# Patient Record
Sex: FEMALE | Race: WHITE | NOT HISPANIC OR LATINO | Employment: OTHER | URBAN - METROPOLITAN AREA
[De-identification: names, ages, dates, MRNs, and addresses within clinical notes are randomized per-mention and may not be internally consistent; named-entity substitution may affect disease eponyms.]

---

## 2017-06-07 ENCOUNTER — GENERIC CONVERSION - ENCOUNTER (OUTPATIENT)
Dept: OTHER | Facility: OTHER | Age: 52
End: 2017-06-07

## 2018-08-17 ENCOUNTER — OFFICE VISIT (OUTPATIENT)
Dept: SLEEP CENTER | Facility: CLINIC | Age: 53
End: 2018-08-17
Payer: MEDICARE

## 2018-08-17 VITALS
WEIGHT: 209.4 LBS | HEART RATE: 82 BPM | RESPIRATION RATE: 14 BRPM | HEIGHT: 65 IN | DIASTOLIC BLOOD PRESSURE: 75 MMHG | BODY MASS INDEX: 34.89 KG/M2 | SYSTOLIC BLOOD PRESSURE: 108 MMHG

## 2018-08-17 DIAGNOSIS — E66.9 OBESITY (BMI 30-39.9): ICD-10-CM

## 2018-08-17 DIAGNOSIS — Z99.89 OSA ON CPAP: Primary | ICD-10-CM

## 2018-08-17 DIAGNOSIS — G47.33 OSA ON CPAP: Primary | ICD-10-CM

## 2018-08-17 PROCEDURE — 99213 OFFICE O/P EST LOW 20 MIN: CPT | Performed by: PSYCHIATRY & NEUROLOGY

## 2018-08-17 NOTE — PROGRESS NOTES
Progress Note - [address] [place] 46 y o  female   :1965, MRN: [id number]  2018          Follow Up Evaluation / Problem:     Obstructive Sleep Apnea    HPI: Wilfrido Ybarra is a 46y o  year old female  Patient has obstructive sleep apnea with AHI of 9 9 events per hours of sleep & during REM sleep 32 4 diagnosed in 2017   Patient has been using CPAP machine  PAP Pressure: Nasal CPAP set to deliver 9 cm of water pressure       Mask Used:    Oronasal/Full Face         DME Provider: Elias Benson    She has difficulty in using CPAP due to back issues  She has dental surgery in 2018 and has difficulty in using CPAP for 2 weeks in   She has been trying to use CPAP machine more frequently and I encouraged her to do so  Patient reported to have benefit in symptom of snoring and mild benefit daytime sleepiness and fatigue while using CPAP machine  Patient has been trying to use CPAP therapy for > 4 hours per night on >70% of nights  EPWORTH SLEEPINESS SCORE : 10      The CPAP download data was reviewed which shows reduce adherence with CPAP   The finding discussed with the patient  Patient needs new CPAP supplies  Yes     Patient denies any issues with CPAP mask or pressure           Current Outpatient Prescriptions:     busPIRone (BUSPAR) 10 mg tablet, Take 10 mg by mouth 2 (two) times a day, Disp: , Rfl: 5    butalbital-acetaminophen-caffeine (FIORICET,ESGIC) -40 mg per tablet, Take 1 tablet by mouth every 4 (four) hours, Disp: , Rfl:     cyclobenzaprine (FLEXERIL) 10 mg tablet, TAKE 1 TABLET BY ORAL ROUTE 2 TIMES EVERY DAY AS NEEDED FOR MUSCLE SPASM, Disp: , Rfl: 0    FLUoxetine (PROzac) 40 MG capsule, TAKE 1 CAPSULE BY ORAL ROUTE EVERY DAY IN THE MORNING, Disp: , Rfl: 0    furosemide (LASIX) 40 mg tablet, TAKE 1/2 TABLET BY ORAL ROUTE EVERY DAY, Disp: , Rfl: 0    lisinopril (ZESTRIL) 10 mg tablet, Take 10 mg by mouth every evening, Disp: , Rfl: 1    metoprolol tartrate (LOPRESSOR) 25 mg tablet, Take 1 5 tablets by mouth daily  , Disp: , Rfl:     simvastatin (ZOCOR) 10 mg tablet, Take 10 mg by mouth every evening, Disp: , Rfl: 5    topiramate (TOPAMAX) 25 mg tablet, TAKE 2 TABLETS BY MOUTH IN THE MORNING AND TAKE 2 TABLETS AT BEDTIME, Disp: , Rfl: 5    traMADol (ULTRAM) 50 mg tablet, Take 50 mg by mouth every 6 (six) hours, Disp: , Rfl:     FLUoxetine (PROzac) 40 MG capsule, Take 40 mg by mouth, Disp: , Rfl:     hydrOXYzine HCL (ATARAX) 25 mg tablet, Take 25 mg by mouth every 6 (six) hours, Disp: , Rfl:     Vitals:    08/17/18 1006   BP: 108/75   BP Location: Right arm   Patient Position: Sitting   Cuff Size: Standard   Pulse: 82   Resp: 14   Weight: 95 kg (209 lb 6 4 oz)   Height: 5' 5" (1 651 m)     PMHx:  Depression, Anxiety, Low back pain, CHF, s/p cardiac pacemake and defibrillator placement  History reviewed  No pertinent past medical history  History   Smoking Status    Heavy Tobacco Smoker    Packs/day: 1 00    Types: Cigarettes   Smokeless Tobacco    Never Used       Allergies   Allergen Reactions    Cefaclor Myalgia    Ciprofloxacin Itching    Penicillin V Rash    Sulfa Antibiotics Rash       Review of Systems      Genitourinary none   Cardiology none   Gastrointestinal none   Neurology frequent headaches and awaken with headache   Constitutional none   Integumentary none   Psychiatry anxiety and depression   Musculoskeletal back pain   Pulmonary snoring   ENT none   Endocrine excessive thirst   Hematological none       OBJECTIVE:      Physical Exam:     General Appearance:   Alert, cooperative, no distress, appears stated age  Head:   Normocephalic, without obvious abnormality, atraumatic     Nose:  Nares normal, septum   Mild deviation            Throat:  Teeth and gums: Unremarkable      Tongue normal size and  midline   Uvula : Unremarkable Tonsils: Unremarkable  Mallampati class:   3 Heart:    Lungs:   Regular rate and rhythm, S1 and S2 normal       Lungs are clear to auscultation        Extremities:  No ankle edema     Neurologic:  Patient found to be awake, alert and has adequate orientation  Speech adequate  Patient can move eyes across midline  No facial weakness  Patient can move extremities against gravity  ASSESSMENT / PLAN    Encounter Diagnoses   Name Primary?  LUIS MIGUEL on CPAP Yes    Obesity (BMI 30-39  9)      She has obstructive sleep apnea with initial difficulty in using CPAP machine but now she is trying her best to use CPAP machine on a regular basis and at least 4 hours every night for on most of the days  I encouraged her to do so  The patient was advised to do regular physical activity and diet control to attain ideal body weight  I plan to see patient back in sleep clinic in  3 months or earlier, if needed  The following instructions have been given to the patient today:    Patient Instructions   USE CPAP MACHINE ON REGULAR BASIS           MD Patria Gleason 15

## 2018-09-28 ENCOUNTER — TELEPHONE (OUTPATIENT)
Dept: NEUROLOGY | Facility: CLINIC | Age: 53
End: 2018-09-28

## 2018-09-28 NOTE — TELEPHONE ENCOUNTER
Is on topirimate 25 mg takes two tabs bid  Has seen dr Bhandari Coleman and put on cpap machine but is still having headaches  Can medication be increased?

## 2018-10-01 NOTE — TELEPHONE ENCOUNTER
Unable to do anything at this point in time  Patient's last appointment here was a bit over a year ago and she missed her follow-up and presently has no schedule appointment  In order for me to provide any further input she will need an appropriate follow-up here

## 2018-10-24 ENCOUNTER — OFFICE VISIT (OUTPATIENT)
Dept: NEUROLOGY | Facility: CLINIC | Age: 53
End: 2018-10-24
Payer: MEDICARE

## 2018-10-24 VITALS
SYSTOLIC BLOOD PRESSURE: 116 MMHG | BODY MASS INDEX: 34.79 KG/M2 | HEART RATE: 70 BPM | HEIGHT: 65 IN | WEIGHT: 208.8 LBS | DIASTOLIC BLOOD PRESSURE: 78 MMHG | RESPIRATION RATE: 18 BRPM

## 2018-10-24 DIAGNOSIS — R51.9 CHRONIC DAILY HEADACHE: Primary | ICD-10-CM

## 2018-10-24 PROCEDURE — 99213 OFFICE O/P EST LOW 20 MIN: CPT | Performed by: PSYCHIATRY & NEUROLOGY

## 2018-10-24 RX ORDER — TOPIRAMATE 25 MG/1
TABLET ORAL
Qty: 90 TABLET | Refills: 2 | Status: SHIPPED | OUTPATIENT
Start: 2018-10-24 | End: 2018-11-20 | Stop reason: DRUGHIGH

## 2018-10-24 NOTE — LETTER
October 24, 2018     Mabelyue RobertsShanna, [address] [facility] [address]    Patient: Marli Casey   YOB: 1965   Date of Visit: 10/24/2018       Dear Dr Wily Leonard: Thank you for referring Brianna Julien to me for evaluation  Below are my notes for this consultation  If you have questions, please do not hesitate to call me  I look forward to following your patient along with you  Sincerely,        Nikunj Brush MD        CC: No Recipients  Nikunj Brush MD  10/24/2018 12:42 PM  Sign at close encounter  Patient is here today for her headaches    Patient ID: Marli Casey is a 46 y o  female  Assessment/Plan:    Chronic daily headache  Headache with characteristics of both muscle contraction or tension type as well as vascular components (describing superimposed migrainous type headaches 2-3 times weekly at the present time)  Initially did very well on low-dose topiramate but as time has gone on has had the breakthrough with the headache circumstance as outlined above  She is still on a low-dose of topiramate and hopefully with further advancement in dose she will again achieve better control  Unfortunately, the treatment of her diagnosed mild obstructive sleep apnea has not created the environment for overall improvement in her headaches  --advance topiramate to 75 mg twice daily  --to maintain a headache record  --to call the office in 2-3 weeks with a status report or before then should she have any questions or difficulties tolerating the increase in topiramate  She will follow up in 2 months    Subjective:    HPI  Patient, 46years of age, has been followed here for daily headache up  Her headaches circumstance has been characterized by both tension-type as well as vascular (migrainous) components  She was initially placed on topiramate and was doing very well well at least initially on a dose of 50 mg twice daily    Unfortunately, with the passage of time, and she did miss her last follow-up appointment, her headaches have re-escalated to the point where she is back to having daily headache, primarily tension-type, but on 2-3 occasions weekly will have a true migrainous component appear  She was diagnosed with mild obstructive sleep apnea and all were hopeful that her headaches would improve with CPAP treatment  She has been faithfully using her CPAP but has had no positive impact on her headaches circumstance  She did have blood work performed and the results were reviewed  CBC with hemoglobin 14 0, hematocrit 40 2, white count 11 0 and platelet count 868  CMP was essentially unremarkable with a CO2 of 22 and a chloride of 105      Past Medical History:   Diagnosis Date    Anxiety and depression     [facility]     Heart disease     Lower back injury     Migraine without aura     Tension-type headache     V tach ([facility])     with placement of a dual pacemaker and defibrillator     Past Surgical History:   Procedure Laterality Date    CARDIAC DEFIBRILLATOR PLACEMENT      CARDIAC PACEMAKER PLACEMENT       Social History     Social History    Marital status: /Civil Union     Spouse name: N/A    Number of children: N/A    Years of education: N/A     Social History Main Topics    Smoking status: Heavy Tobacco Smoker     Packs/day: 1 00     Types: Cigarettes    Smokeless tobacco: Never Used    Alcohol use Yes      Comment: social     Drug use: No    Sexual activity: Not Asked     Other Topics Concern    None     Social History Narrative    None     Family History   Problem Relation Age of Onset    Heart disease Family     Stroke Family      Allergies   Allergen Reactions    Cefaclor Myalgia    Ciprofloxacin Itching    Penicillin V Rash    Sulfa Antibiotics Rash       Current Outpatient Prescriptions:     busPIRone (BUSPAR) 10 mg tablet, Take 10 mg by mouth 2 (two) times a day, Disp: , Rfl: 5   butalbital-acetaminophen-caffeine (FIORICET,ESGIC) -40 mg per tablet, Take 1 tablet by mouth every 4 (four) hours, Disp: , Rfl:     cyclobenzaprine (FLEXERIL) 10 mg tablet, TAKE 1 TABLET BY ORAL ROUTE 2 TIMES EVERY DAY AS NEEDED FOR MUSCLE SPASM, Disp: , Rfl: 0    FLUoxetine (PROzac) 40 MG capsule, TAKE 1 CAPSULE BY ORAL ROUTE EVERY DAY IN THE MORNING, Disp: , Rfl: 0    furosemide (LASIX) 40 mg tablet, TAKE 1/2 TABLET BY ORAL ROUTE EVERY DAY, Disp: , Rfl: 0    hydrOXYzine HCL (ATARAX) 25 mg tablet, Take 25 mg by mouth every 6 (six) hours, Disp: , Rfl:     lisinopril (ZESTRIL) 10 mg tablet, Take 10 mg by mouth every evening, Disp: , Rfl: 1    metoprolol tartrate (LOPRESSOR) 25 mg tablet, Take 1 5 tablets by mouth daily  , Disp: , Rfl:     simvastatin (ZOCOR) 10 mg tablet, Take 10 mg by mouth every evening, Disp: , Rfl: 5    topiramate (TOPAMAX) 25 mg tablet, Take 1 5 tablets in the morning and 1 5 tablets in the evening or as directed, Disp: 90 tablet, Rfl: 2    traMADol (ULTRAM) 50 mg tablet, Take 50 mg by mouth every 6 (six) hours, Disp: , Rfl:     Objective:    Blood pressure 116/78, pulse 70, resp  rate 18, height 5' 5" (1 651 m), weight 94 7 kg (208 lb 12 8 oz)  Physical Exam  Lungs clear to auscultation  Rhythm regular  Neurological Exam  Alert  Pleasantly interactive  Fully oriented  No dysarthria  Unremarkable spontaneous gait  Able to tandem walk  Romberg maneuver performed unremarkably  Cranial nerves 2-12 tested and grossly intact except for difficulties appreciating finger rub right ear which is old  Funduscopic examination with marginated discs bilaterally  Accurate finger-to-nose bilaterally  Motor testing revealed full symmetrical strength throughout the 4 extremities with no upper extremity drift  Sensory testing grossly intact to pin and position throughout    Muscle stretch reflexes bilaterally 1 throughout the upper extremities and bilaterally 1+ at the knees and ankles  Toe response is downgoing bilaterally  ROS:    Review of Systems   Constitutional: Negative  Negative for appetite change and fever  HENT: Negative  Negative for hearing loss, tinnitus, trouble swallowing and voice change  Eyes: Negative  Negative for photophobia and pain  Respiratory: Negative  Negative for shortness of breath  Cardiovascular: Negative  Negative for palpitations  Gastrointestinal: Negative  Negative for nausea and vomiting  Endocrine: Negative  Negative for cold intolerance and heat intolerance  Genitourinary: Positive for frequency and urgency  Negative for dysuria  Musculoskeletal: Positive for back pain (lower back), neck pain and neck stiffness  Negative for myalgias  Skin: Negative  Negative for rash  Allergic/Immunologic: Negative  Neurological: Positive for headaches  Negative for dizziness, tremors, seizures, syncope, facial asymmetry, speech difficulty, weakness, light-headedness and numbness  Hematological: Negative  Does not bruise/bleed easily  Psychiatric/Behavioral: Positive for sleep disturbance  Negative for confusion and hallucinations  I personally reviewed the ROS that was entered by the medical assistant

## 2018-10-24 NOTE — ASSESSMENT & PLAN NOTE
Headache with characteristics of both muscle contraction or tension type as well as vascular components (describing superimposed migrainous type headaches 2-3 times weekly at the present time)  Initially did very well on low-dose topiramate but as time has gone on has had the breakthrough with the headache circumstance as outlined above  She is still on a low-dose of topiramate and hopefully with further advancement in dose she will again achieve better control  Unfortunately, the treatment of her diagnosed mild obstructive sleep apnea has not created the environment for overall improvement in her headaches  --advance topiramate to 75 mg twice daily  --to maintain a headache record  --to call the office in 2-3 weeks with a status report or before then should she have any questions or difficulties tolerating the increase in topiramate

## 2018-10-24 NOTE — PATIENT INSTRUCTIONS
Using year current supply of topiramate 25 mg tablets begin taking 3 tablets in the morning and 3 tablets in the evening daily  When the prescription runs out and you refill it with topiramate 50 mg tablets take 1 5 tablets in the morning and 1 5 tablets in the evening  Please keep a record of ongoing headaches  Call in 2-3 weeks with a status update

## 2018-10-24 NOTE — PROGRESS NOTES
Patient is here today for her headaches    Patient ID: Polly Mendoza is a 46 y o  female  Assessment/Plan:    Chronic daily headache  Headache with characteristics of both muscle contraction or tension type as well as vascular components (describing superimposed migrainous type headaches 2-3 times weekly at the present time)  Initially did very well on low-dose topiramate but as time has gone on has had the breakthrough with the headache circumstance as outlined above  She is still on a low-dose of topiramate and hopefully with further advancement in dose she will again achieve better control  Unfortunately, the treatment of her diagnosed mild obstructive sleep apnea has not created the environment for overall improvement in her headaches  --advance topiramate to 75 mg twice daily  --to maintain a headache record  --to call the office in 2-3 weeks with a status report or before then should she have any questions or difficulties tolerating the increase in topiramate  She will follow up in 2 months    Subjective:    HPI  Patient, 46years of age, has been followed here for daily headache up  Her headaches circumstance has been characterized by both tension-type as well as vascular (migrainous) components  She was initially placed on topiramate and was doing very well well at least initially on a dose of 50 mg twice daily  Unfortunately, with the passage of time, and she did miss her last follow-up appointment, her headaches have re-escalated to the point where she is back to having daily headache, primarily tension-type, but on 2-3 occasions weekly will have a true migrainous component appear  She was diagnosed with mild obstructive sleep apnea and all were hopeful that her headaches would improve with CPAP treatment  She has been faithfully using her CPAP but has had no positive impact on her headaches circumstance  She did have blood work performed and the results were reviewed    CBC with hemoglobin 14 0, hematocrit 40 2, white count 11 0 and platelet count 248  CMP was essentially unremarkable with a CO2 of 22 and a chloride of 105      Past Medical History:   Diagnosis Date    Anxiety and depression     [facility]     Heart disease     Lower back injury     Migraine without aura     Tension-type headache     V tach ([facility])     with placement of a dual pacemaker and defibrillator     Past Surgical History:   Procedure Laterality Date    CARDIAC DEFIBRILLATOR PLACEMENT      CARDIAC PACEMAKER PLACEMENT       Social History     Social History    Marital status: /Civil Union     Spouse name: N/A    Number of children: N/A    Years of education: N/A     Social History Main Topics    Smoking status: Heavy Tobacco Smoker     Packs/day: 1 00     Types: Cigarettes    Smokeless tobacco: Never Used    Alcohol use Yes      Comment: social     Drug use: No    Sexual activity: Not Asked     Other Topics Concern    None     Social History Narrative    None     Family History   Problem Relation Age of Onset    Heart disease Family     Stroke Family      Allergies   Allergen Reactions    Cefaclor Myalgia    Ciprofloxacin Itching    Penicillin V Rash    Sulfa Antibiotics Rash       Current Outpatient Prescriptions:     busPIRone (BUSPAR) 10 mg tablet, Take 10 mg by mouth 2 (two) times a day, Disp: , Rfl: 5    butalbital-acetaminophen-caffeine (FIORICET,ESGIC) -40 mg per tablet, Take 1 tablet by mouth every 4 (four) hours, Disp: , Rfl:     cyclobenzaprine (FLEXERIL) 10 mg tablet, TAKE 1 TABLET BY ORAL ROUTE 2 TIMES EVERY DAY AS NEEDED FOR MUSCLE SPASM, Disp: , Rfl: 0    FLUoxetine (PROzac) 40 MG capsule, TAKE 1 CAPSULE BY ORAL ROUTE EVERY DAY IN THE MORNING, Disp: , Rfl: 0    furosemide (LASIX) 40 mg tablet, TAKE 1/2 TABLET BY ORAL ROUTE EVERY DAY, Disp: , Rfl: 0    hydrOXYzine HCL (ATARAX) 25 mg tablet, Take 25 mg by mouth every 6 (six) hours, Disp: , Rfl:     lisinopril (ZESTRIL) 10 mg tablet, Take 10 mg by mouth every evening, Disp: , Rfl: 1    metoprolol tartrate (LOPRESSOR) 25 mg tablet, Take 1 5 tablets by mouth daily  , Disp: , Rfl:     simvastatin (ZOCOR) 10 mg tablet, Take 10 mg by mouth every evening, Disp: , Rfl: 5    topiramate (TOPAMAX) 25 mg tablet, Take 1 5 tablets in the morning and 1 5 tablets in the evening or as directed, Disp: 90 tablet, Rfl: 2    traMADol (ULTRAM) 50 mg tablet, Take 50 mg by mouth every 6 (six) hours, Disp: , Rfl:     Objective:    Blood pressure 116/78, pulse 70, resp  rate 18, height 5' 5" (1 651 m), weight 94 7 kg (208 lb 12 8 oz)  Physical Exam  Lungs clear to auscultation  Rhythm regular  Neurological Exam  Alert  Pleasantly interactive  Fully oriented  No dysarthria  Unremarkable spontaneous gait  Able to tandem walk  Romberg maneuver performed unremarkably  Cranial nerves 2-12 tested and grossly intact except for difficulties appreciating finger rub right ear which is old  Funduscopic examination with marginated discs bilaterally  Accurate finger-to-nose bilaterally  Motor testing revealed full symmetrical strength throughout the 4 extremities with no upper extremity drift  Sensory testing grossly intact to pin and position throughout  Muscle stretch reflexes bilaterally 1 throughout the upper extremities and bilaterally 1+ at the knees and ankles  Toe response is downgoing bilaterally  ROS:    Review of Systems   Constitutional: Negative  Negative for appetite change and fever  HENT: Negative  Negative for hearing loss, tinnitus, trouble swallowing and voice change  Eyes: Negative  Negative for photophobia and pain  Respiratory: Negative  Negative for shortness of breath  Cardiovascular: Negative  Negative for palpitations  Gastrointestinal: Negative  Negative for nausea and vomiting  Endocrine: Negative  Negative for cold intolerance and heat intolerance  Genitourinary: Positive for frequency and urgency  Negative for dysuria  Musculoskeletal: Positive for back pain (lower back), neck pain and neck stiffness  Negative for myalgias  Skin: Negative  Negative for rash  Allergic/Immunologic: Negative  Neurological: Positive for headaches  Negative for dizziness, tremors, seizures, syncope, facial asymmetry, speech difficulty, weakness, light-headedness and numbness  Hematological: Negative  Does not bruise/bleed easily  Psychiatric/Behavioral: Positive for sleep disturbance  Negative for confusion and hallucinations  I personally reviewed the ROS that was entered by the medical assistant

## 2018-11-20 DIAGNOSIS — R51.9 CHRONIC DAILY HEADACHE: Primary | ICD-10-CM

## 2018-11-20 RX ORDER — TOPIRAMATE 50 MG/1
TABLET, FILM COATED ORAL
Qty: 90 TABLET | Refills: 2 | Status: SHIPPED | OUTPATIENT
Start: 2018-11-20 | End: 2019-02-23 | Stop reason: SDUPTHER

## 2019-01-04 ENCOUNTER — OFFICE VISIT (OUTPATIENT)
Dept: NEUROLOGY | Facility: CLINIC | Age: 54
End: 2019-01-04
Payer: MEDICARE

## 2019-01-04 VITALS
SYSTOLIC BLOOD PRESSURE: 122 MMHG | HEIGHT: 65 IN | HEART RATE: 90 BPM | DIASTOLIC BLOOD PRESSURE: 88 MMHG | WEIGHT: 212.4 LBS | RESPIRATION RATE: 18 BRPM | BODY MASS INDEX: 35.39 KG/M2

## 2019-01-04 DIAGNOSIS — G43.009 MIGRAINE WITHOUT AURA AND WITHOUT STATUS MIGRAINOSUS, NOT INTRACTABLE: Primary | ICD-10-CM

## 2019-01-04 DIAGNOSIS — R69 TAKING MULTIPLE MEDICATIONS FOR CHRONIC DISEASE: ICD-10-CM

## 2019-01-04 PROBLEM — R51.9 CHRONIC DAILY HEADACHE: Status: RESOLVED | Noted: 2018-10-24 | Resolved: 2019-01-04

## 2019-01-04 PROCEDURE — 99213 OFFICE O/P EST LOW 20 MIN: CPT | Performed by: PSYCHIATRY & NEUROLOGY

## 2019-01-04 NOTE — PROGRESS NOTES
Patient is here today for her migraine's    Patient ID: Sammy Castro is a 46 y o  female  Assessment/Plan:    Migraine without aura and without status migrainosus, not intractable  Has done extremely well with the advancement in her topiramate schedule  She has gone from a circumstance of headache every day of a tension-type in conjunction with three or more migraine type episodes weekly to now only three milder migraine headaches scattered over the past two months and only a very occasional tension-type headache  When she does have her migraines as of late, they are mild and respond generally to acetaminophen  --continue topiramate 50 mg tablets 1 5 tablets 2 times daily  --with her now on a chronically elevated topiramate schedule, check CBC, CMP and topiramate level  She will follow up in three months or p r n     Subjective:    HPI  Patient, 46years of age, returns to reassess the status of her headache circumstance  When last seen she was experiencing basically daily headache, mostly of tension-type, but on a weekly basis with three or perhaps more migraine type headache superimposed  Her topiramate schedule has been advanced to 75 mg twice daily  She has done very well  In fact, over the past two months or so she has had only three of her migraines, generally milder than in the past and responding to acetameophen  Her tension-type headaches are occurring only very occasionally now  She expresses no symptoms to suggest any adverse side effects from the advanced topiramate  She is very pleased with her overall improvement and at this point in time wishes no changes in her current schedule      Past Medical History:   Diagnosis Date    Anxiety and depression     [facility]     Heart disease     Lower back injury     Migraine without aura     Tension-type headache     V tach ([address] )     with placement of a dual pacemaker and defibrillator     Past Surgical History:   Procedure Laterality Date    CARDIAC DEFIBRILLATOR PLACEMENT      CARDIAC PACEMAKER PLACEMENT       Social History     Social History    Marital status: /Civil Union     Spouse name: N/A    Number of children: N/A    Years of education: N/A     Social History Main Topics    Smoking status: Heavy Tobacco Smoker     Packs/day: 1 00     Types: Cigarettes    Smokeless tobacco: Never Used    Alcohol use Yes      Comment: social     Drug use: No    Sexual activity: Not Asked     Other Topics Concern    None     Social History Narrative    None     Family History   Problem Relation Age of Onset    Heart disease Family     Stroke Family      Allergies   Allergen Reactions    Cefaclor Myalgia    Ciprofloxacin Itching    Penicillin V Rash    Sulfa Antibiotics Rash       Current Outpatient Prescriptions:     busPIRone (BUSPAR) 10 mg tablet, Take 10 mg by mouth 2 (two) times a day, Disp: , Rfl: 5    butalbital-acetaminophen-caffeine (FIORICET,ESGIC) -40 mg per tablet, Take 1 tablet by mouth every 4 (four) hours, Disp: , Rfl:     cyclobenzaprine (FLEXERIL) 10 mg tablet, TAKE 1 TABLET BY ORAL ROUTE 2 TIMES EVERY DAY AS NEEDED FOR MUSCLE SPASM, Disp: , Rfl: 0    FLUoxetine (PROzac) 40 MG capsule, TAKE 1 CAPSULE BY ORAL ROUTE EVERY DAY IN THE MORNING, Disp: , Rfl: 0    furosemide (LASIX) 40 mg tablet, TAKE 1/2 TABLET BY ORAL ROUTE EVERY DAY, Disp: , Rfl: 0    lisinopril (ZESTRIL) 10 mg tablet, Take 10 mg by mouth every evening, Disp: , Rfl: 1    metoprolol tartrate (LOPRESSOR) 25 mg tablet, Take 1 5 tablets by mouth daily  , Disp: , Rfl:     simvastatin (ZOCOR) 10 mg tablet, Take 10 mg by mouth every evening, Disp: , Rfl: 5    topiramate (TOPAMAX) 50 MG tablet, By oral route take 1 5 tablets twice daily or as directed, Disp: 90 tablet, Rfl: 2    traMADol (ULTRAM) 50 mg tablet, Take 50 mg by mouth every 6 (six) hours, Disp: , Rfl:     hydrOXYzine HCL (ATARAX) 25 mg tablet, Take 25 mg by mouth every 6 (six) hours, Disp: , Rfl:     Objective:    Blood pressure 122/88, pulse 90, resp  rate 18, height 5' 5" (1 651 m), weight 96 3 kg (212 lb 6 4 oz)  Physical Exam  Lungs clear to auscultation  Rhythm regular  Neurological Exam  Alert  Pleasantly interactive  Fully oriented and in good mood  Unremarkable spontaneous gait  Cranial nerves 2-12 tested and grossly intact except for issues with hearing right ear which is old  Funduscopic examination with marginated discs bilaterally  Good symmetrical strength throughout the four extremities with no upper extremity drift  Muscle stretch reflexes bilaterally 1 throughout the upper extremities and bilaterally 1+ at the knees and ankles  Toe response downgoing bilaterally  ROS:    Review of Systems   Constitutional: Negative  Negative for appetite change and fever  HENT: Negative  Negative for hearing loss, tinnitus, trouble swallowing and voice change  Eyes: Negative  Negative for photophobia and pain  Respiratory: Negative  Negative for shortness of breath  Cardiovascular: Negative  Negative for palpitations  Gastrointestinal: Negative  Negative for nausea and vomiting  Endocrine: Negative  Negative for cold intolerance and heat intolerance  Genitourinary: Positive for frequency and urgency  Negative for dysuria  Musculoskeletal: Negative  Negative for myalgias and neck pain  Skin: Negative  Negative for rash  Neurological: Negative  Negative for dizziness, tremors, seizures, syncope, facial asymmetry, speech difficulty, weakness, light-headedness, numbness and headaches  Hematological: Negative  Does not bruise/bleed easily  Psychiatric/Behavioral: Positive for sleep disturbance (at times)  Negative for confusion and hallucinations  I personally reviewed the ROS that was entered by the medical assistant      *Please note this document was created using voice recognition software and may contain sound-alike word errors  *

## 2019-01-04 NOTE — LETTER
January 4, 2019     [place], [address] [facility] [address]    Patient: Gregor Rizvi   YOB: 1965   Date of Visit: 1/4/2019       Dear Dr Srinivasa Rodney: Thank you for referring Yordan Blanchard to me for evaluation  Below are my notes for this consultation  If you have questions, please do not hesitate to call me  I look forward to following your patient along with you  Sincerely,        India Walker MD        CC: No Recipients  India Walker MD  1/4/2019 11:29 AM  Sign at close encounter  Patient is here today for her migraine's    Patient ID: Gregor Rizvi is a 46 y o  female  Assessment/Plan:    Migraine without aura and without status migrainosus, not intractable  Has done extremely well with the advancement in her topiramate schedule  She has gone from a circumstance of headache every day of a tension-type in conjunction with three or more migraine type episodes weekly to now only three milder migraine headaches scattered over the past two months and only a very occasional tension-type headache  When she does have her migraines as of late, they are mild and respond generally to acetaminophen  --continue topiramate 50 mg tablets 1 5 tablets 2 times daily  --with her now on a chronically elevated topiramate schedule, check CBC, CMP and topiramate level  She will follow up in three months or p r n     Subjective:    HPI  Patient, 46years of age, returns to reassess the status of her headache circumstance  When last seen she was experiencing basically daily headache, mostly of tension-type, but on a weekly basis with three or perhaps more migraine type headache superimposed  Her topiramate schedule has been advanced to 75 mg twice daily  She has done very well  In fact, over the past two months or so she has had only three of her migraines, generally milder than in the past and responding to acetameophen    Her tension-type headaches are occurring only very occasionally now  She expresses no symptoms to suggest any adverse side effects from the advanced topiramate  She is very pleased with her overall improvement and at this point in time wishes no changes in her current schedule      Past Medical History:   Diagnosis Date    Anxiety and depression     [facility]     Heart disease     Lower back injury     Migraine without aura     Tension-type headache     V tach (HCC)     with placement of a dual pacemaker and defibrillator     Past Surgical History:   Procedure Laterality Date    CARDIAC DEFIBRILLATOR PLACEMENT      CARDIAC PACEMAKER PLACEMENT       Social History     Social History    Marital status: /Civil Union     Spouse name: N/A    Number of children: N/A    Years of education: N/A     Social History Main Topics    Smoking status: Heavy Tobacco Smoker     Packs/day: 1 00     Types: Cigarettes    Smokeless tobacco: Never Used    Alcohol use Yes      Comment: social     Drug use: No    Sexual activity: Not Asked     Other Topics Concern    None     Social History Narrative    None     Family History   Problem Relation Age of Onset    Heart disease Family     Stroke Family      Allergies   Allergen Reactions    Cefaclor Myalgia    Ciprofloxacin Itching    Penicillin V Rash    Sulfa Antibiotics Rash       Current Outpatient Prescriptions:     busPIRone (BUSPAR) 10 mg tablet, Take 10 mg by mouth 2 (two) times a day, Disp: , Rfl: 5    butalbital-acetaminophen-caffeine (FIORICET,ESGIC) -40 mg per tablet, Take 1 tablet by mouth every 4 (four) hours, Disp: , Rfl:     cyclobenzaprine (FLEXERIL) 10 mg tablet, TAKE 1 TABLET BY ORAL ROUTE 2 TIMES EVERY DAY AS NEEDED FOR MUSCLE SPASM, Disp: , Rfl: 0    FLUoxetine (PROzac) 40 MG capsule, TAKE 1 CAPSULE BY ORAL ROUTE EVERY DAY IN THE MORNING, Disp: , Rfl: 0    furosemide (LASIX) 40 mg tablet, TAKE 1/2 TABLET BY ORAL ROUTE EVERY DAY, Disp: , Rfl: 0    lisinopril (ZESTRIL) 10 mg tablet, Take 10 mg by mouth every evening, Disp: , Rfl: 1    metoprolol tartrate (LOPRESSOR) 25 mg tablet, Take 1 5 tablets by mouth daily  , Disp: , Rfl:     simvastatin (ZOCOR) 10 mg tablet, Take 10 mg by mouth every evening, Disp: , Rfl: 5    topiramate (TOPAMAX) 50 MG tablet, By oral route take 1 5 tablets twice daily or as directed, Disp: 90 tablet, Rfl: 2    traMADol (ULTRAM) 50 mg tablet, Take 50 mg by mouth every 6 (six) hours, Disp: , Rfl:     hydrOXYzine HCL (ATARAX) 25 mg tablet, Take 25 mg by mouth every 6 (six) hours, Disp: , Rfl:     Objective:    Blood pressure 122/88, pulse 90, resp  rate 18, height 5' 5" (1 651 m), weight 96 3 kg (212 lb 6 4 oz)  Physical Exam  Lungs clear to auscultation  Rhythm regular  Neurological Exam  Alert  Pleasantly interactive  Fully oriented and in good mood  Unremarkable spontaneous gait  Cranial nerves 2-12 tested and grossly intact except for issues with hearing right ear which is old  Funduscopic examination with marginated discs bilaterally  Good symmetrical strength throughout the four extremities with no upper extremity drift  Muscle stretch reflexes bilaterally 1 throughout the upper extremities and bilaterally 1+ at the knees and ankles  Toe response downgoing bilaterally  ROS:    Review of Systems   Constitutional: Negative  Negative for appetite change and fever  HENT: Negative  Negative for hearing loss, tinnitus, trouble swallowing and voice change  Eyes: Negative  Negative for photophobia and pain  Respiratory: Negative  Negative for shortness of breath  Cardiovascular: Negative  Negative for palpitations  Gastrointestinal: Negative  Negative for nausea and vomiting  Endocrine: Negative  Negative for cold intolerance and heat intolerance  Genitourinary: Positive for frequency and urgency  Negative for dysuria  Musculoskeletal: Negative  Negative for myalgias and neck pain  Skin: Negative  Negative for rash  Neurological: Negative  Negative for dizziness, tremors, seizures, syncope, facial asymmetry, speech difficulty, weakness, light-headedness, numbness and headaches  Hematological: Negative  Does not bruise/bleed easily  Psychiatric/Behavioral: Positive for sleep disturbance (at times)  Negative for confusion and hallucinations  I personally reviewed the ROS that was entered by the medical assistant  *Please note this document was created using voice recognition software and may contain sound-alike word errors  *

## 2019-01-04 NOTE — ASSESSMENT & PLAN NOTE
Has done extremely well with the advancement in her topiramate schedule  She has gone from a circumstance of headache every day of a tension-type in conjunction with three or more migraine type episodes weekly to now only three milder migraine headaches scattered over the past two months and only a very occasional tension-type headache  When she does have her migraines as of late, they are mild and respond generally to acetaminophen  --continue topiramate 50 mg tablets 1 5 tablets 2 times daily  --with her now on a chronically elevated topiramate schedule, check CBC, CMP and topiramate level

## 2019-02-23 DIAGNOSIS — R51.9 CHRONIC DAILY HEADACHE: ICD-10-CM

## 2019-02-25 RX ORDER — TOPIRAMATE 50 MG/1
TABLET, FILM COATED ORAL
Qty: 90 TABLET | Refills: 2 | Status: SHIPPED | OUTPATIENT
Start: 2019-02-25 | End: 2019-05-26 | Stop reason: SDUPTHER

## 2019-03-25 ENCOUNTER — TELEPHONE (OUTPATIENT)
Dept: NEUROLOGY | Facility: CLINIC | Age: 54
End: 2019-03-25

## 2019-03-25 NOTE — TELEPHONE ENCOUNTER
I called and left a message for the patient to complete her blood work for her appointment on 4/5/19

## 2019-03-29 NOTE — TELEPHONE ENCOUNTER
Pt called back to state that she will have labs done at [facility] labs  Faxed orders to [phone]

## 2019-05-24 ENCOUNTER — TELEPHONE (OUTPATIENT)
Dept: NEUROLOGY | Facility: CLINIC | Age: 54
End: 2019-05-24

## 2019-05-26 DIAGNOSIS — R51.9 CHRONIC DAILY HEADACHE: ICD-10-CM

## 2019-05-28 RX ORDER — TOPIRAMATE 50 MG/1
TABLET, FILM COATED ORAL
Qty: 90 TABLET | Refills: 2 | Status: SHIPPED | OUTPATIENT
Start: 2019-05-28 | End: 2019-09-27 | Stop reason: DRUGHIGH

## 2019-09-27 ENCOUNTER — TELEPHONE (OUTPATIENT)
Dept: NEUROLOGY | Facility: CLINIC | Age: 54
End: 2019-09-27

## 2019-09-27 DIAGNOSIS — G43.009 MIGRAINE WITHOUT AURA AND WITHOUT STATUS MIGRAINOSUS, NOT INTRACTABLE: Primary | ICD-10-CM

## 2019-09-27 RX ORDER — TOPIRAMATE 25 MG/1
TABLET ORAL
Qty: 90 TABLET | Refills: 2 | Status: SHIPPED | OUTPATIENT
Start: 2019-09-27 | End: 2019-12-23 | Stop reason: SDUPTHER

## 2019-09-27 NOTE — TELEPHONE ENCOUNTER
Please contact patient with the following restart on her topiramate  Begin with 25 mg tablets 1 twice daily for 1 week followed by 1 in the morning and 2 in the evening daily  To recheck with the office in 2 weeks  Did review her recent blood work

## 2019-09-27 NOTE — TELEPHONE ENCOUNTER
Patient notify to restart topiramate with 25 mg tablets 1 twice daily for 1 week followed by 1 in the morning and 2 in the evening daily  To recheck in with the office in 2 weeks

## 2019-09-27 NOTE — TELEPHONE ENCOUNTER
Patient stated she weaned off topamax on her own because she wasn't getting headaches as much, has now been off the medication for about one month and now reports her migraines have come back with a vengeance  She is requesting Dr Iain Greenberg refill topiramate  She is going away on vacation next week and is hoping to have something to take  She has a /fu on 12/23  Please send to [facility] on file if agreeable  [phone] okay to leave detailed message

## 2019-12-17 ENCOUNTER — TELEPHONE (OUTPATIENT)
Dept: NEUROLOGY | Facility: CLINIC | Age: 54
End: 2019-12-17

## 2019-12-23 ENCOUNTER — OFFICE VISIT (OUTPATIENT)
Dept: NEUROLOGY | Facility: CLINIC | Age: 54
End: 2019-12-23
Payer: MEDICARE

## 2019-12-23 VITALS
DIASTOLIC BLOOD PRESSURE: 76 MMHG | SYSTOLIC BLOOD PRESSURE: 122 MMHG | BODY MASS INDEX: 36.71 KG/M2 | HEIGHT: 66 IN | RESPIRATION RATE: 18 BRPM | HEART RATE: 94 BPM | WEIGHT: 228.4 LBS

## 2019-12-23 DIAGNOSIS — G43.009 MIGRAINE WITHOUT AURA AND WITHOUT STATUS MIGRAINOSUS, NOT INTRACTABLE: ICD-10-CM

## 2019-12-23 PROCEDURE — 99213 OFFICE O/P EST LOW 20 MIN: CPT | Performed by: PSYCHIATRY & NEUROLOGY

## 2019-12-23 RX ORDER — VENLAFAXINE 37.5 MG/1
37.5 TABLET ORAL 2 TIMES DAILY
COMMUNITY
End: 2019-12-23 | Stop reason: DRUGHIGH

## 2019-12-23 RX ORDER — VENLAFAXINE 75 MG/1
75 TABLET ORAL 2 TIMES DAILY
Refills: 3 | COMMUNITY
Start: 2019-10-01

## 2019-12-23 RX ORDER — TOPIRAMATE 25 MG/1
TABLET ORAL
Qty: 180 TABLET | Refills: 3 | Status: SHIPPED | OUTPATIENT
Start: 2019-12-23 | End: 2020-03-25

## 2019-12-23 NOTE — PROGRESS NOTES
Patient ID: Cecil Tejeda is a 53 y o  female  Assessment/Plan:    Migraine without aura and without status migrainosus, not intractable  Unfortunately, when she self tapered off her to me for made by mid summer, she experienced a significant rebound with regard to her migraine headaches  In fact, prior to calling to reestablish the topiramate, she was experiencing multiple, intense episodes on a weekly basis  Topiramate reinstituted and presently at a schedule of 25 mg in the morning and 50 mg in the evening daily and she now has experience on average of 2 less intense headaches on a weekly basis  Hopeful for yet better control  Her control in the past was on a higher schedule of topiramate  --advance topiramate using 25 mg tablets to 2 tablets twice daily for 1 week, then 2 tablets in the morning and 3 tablets in the evening for 1 week, followed by 3 tablets twice daily  --to maintain a headache diary  --to call the office with a status update in 4 weeks or before then should she have questions or concerns  She will follow up in 8-10 weeks  Subjective:    HPI  Patient, now 53years of age, is being seen with regard to increasing headache issues  As a background, patient does have migraine without aura  At at 1 point in the past she was having multiple weekly episodes  This prompted the initiation of topiramate and at a schedule of 75 mg in the morning and 100 mg in the evening she did exceedingly well  In fact, she decided since she was doing so well to taper off her topiramate and she did so early to mid summer  That resulted in the emergence once again of multiple weekly episodes of intense migraine  She notify the office this past September  Topiramate was re-initiated and she has most recently been on a schedule of 25 mg in the morning and 50 mg in the evening daily  She has been experiencing an average of perhaps 2 headaches weekly, fortunately of less intensity    She is tolerating her current schedule topiramate with no adverse side effects  She did have recent blood work performed  Results reviewed:  CBC with hemoglobin 13 4, crit 38 9, white count 9 6 and platelet count 859; CMP with creatinine 0 84, AST 16 and ALT 29       Past Medical History:   Diagnosis Date    Anxiety and depression     [facility]     Heart disease     Lower back injury     Migraine without aura     Tension-type headache     V tach (HCC)     with placement of a dual pacemaker and defibrillator     Past Surgical History:   Procedure Laterality Date    CARDIAC DEFIBRILLATOR PLACEMENT      CARDIAC PACEMAKER PLACEMENT       Social History     Socioeconomic History    Marital status: /Civil Union     Spouse name: None    Number of children: None    Years of education: None    Highest education level: None   Occupational History    None   Social Needs    Financial resource strain: None    Food insecurity:     Worry: None     Inability: None    Transportation needs:     Medical: None     Non-medical: None   Tobacco Use    Smoking status: Heavy Tobacco Smoker     Packs/day: 1 00     Types: Cigarettes    Smokeless tobacco: Never Used   Substance and Sexual Activity    Alcohol use: Yes     Comment: social     Drug use: No    Sexual activity: None   Lifestyle    Physical activity:     Days per week: None     Minutes per session: None    Stress: None   Relationships    Social connections:     Talks on phone: None     Gets together: None     Attends Hoah[employer]ism service: None     Active member of club or organization: None     Attends meetings of clubs or organizations: None     Relationship status: None    Intimate partner violence:     Fear of current or ex partner: None     Emotionally abused: None     Physically abused: None     Forced sexual activity: None   Other Topics Concern    None   Social History Narrative    None     Family History   Problem Relation Age of Onset  Heart disease Family     Stroke Family      Allergies   Allergen Reactions    Cefaclor Myalgia    Ciprofloxacin Itching    Penicillin V Rash    Sulfa Antibiotics Rash       Current Outpatient Medications:     busPIRone (BUSPAR) 10 mg tablet, Take 10 mg by mouth 2 (two) times a day, Disp: , Rfl: 5    butalbital-acetaminophen-caffeine (FIORICET,ESGIC) -40 mg per tablet, Take 1 tablet by mouth every 4 (four) hours, Disp: , Rfl:     cyclobenzaprine (FLEXERIL) 10 mg tablet, TAKE 1 TABLET BY ORAL ROUTE 2 TIMES EVERY DAY AS NEEDED FOR MUSCLE SPASM, Disp: , Rfl: 0    furosemide (LASIX) 40 mg tablet, TAKE 1/2 TABLET BY ORAL ROUTE EVERY DAY, Disp: , Rfl: 0    hydrOXYzine HCL (ATARAX) 25 mg tablet, Take 25 mg by mouth every 6 (six) hours, Disp: , Rfl:     lisinopril (ZESTRIL) 10 mg tablet, Take 10 mg by mouth every evening, Disp: , Rfl: 1    metoprolol tartrate (LOPRESSOR) 25 mg tablet, Take 1 5 tablets by mouth daily  , Disp: , Rfl:     topiramate (TOPAMAX) 25 mg tablet, By mouth take 3 tablets twice daily or as directed, Disp: 180 tablet, Rfl: 3    traMADol (ULTRAM) 50 mg tablet, Take 50 mg by mouth every 6 (six) hours, Disp: , Rfl:     venlafaxine (EFFEXOR) 75 mg tablet, Take 75 mg by mouth 2 (two) times a day, Disp: , Rfl: 3    FLUoxetine (PROzac) 40 MG capsule, TAKE 1 CAPSULE BY ORAL ROUTE EVERY DAY IN THE MORNING, Disp: , Rfl: 0    simvastatin (ZOCOR) 10 mg tablet, Take 10 mg by mouth every evening, Disp: , Rfl: 5    Objective:    Blood pressure 122/76, pulse 94, resp  rate 18, height 5' 5 5" (1 664 m), weight 104 kg (228 lb 6 4 oz)  Physical Exam  Head normocephalic  Eyes nonicteric  No audible anterior neck bruits  Lungs clear to auscultation  Rhythm regular  GI (abdomen) soft nontender  Bowel sounds present  No significant lower extremity edema  Neurological Exam  Alert  Fully oriented  Appeared in good mood  Pleasantly interactive  Spontaneous gait unremarkable    Cranial nerves 2-12 tested and grossly intact except for issues with hearing right ear, old  Funduscopic examination with bilaterally marginated discs  Accurate with finger-to-nose bilaterally  Good symmetrical strength throughout the 4 extremities  No upper extremity drift  Muscle stretch reflexes bilaterally 1 throughout the upper extremities and bilaterally 1+ at the knees and ankles  ROS:    Review of Systems   Constitutional: Negative  Negative for appetite change and fever  HENT: Negative  Negative for hearing loss, tinnitus, trouble swallowing and voice change  Eyes: Negative  Negative for photophobia and pain  Respiratory: Negative  Negative for shortness of breath  Cardiovascular: Negative  Negative for palpitations  Gastrointestinal: Negative  Negative for nausea and vomiting  Endocrine: Negative  Negative for cold intolerance and heat intolerance  Genitourinary: Negative  Negative for dysuria, frequency and urgency  Musculoskeletal: Negative  Negative for myalgias and neck pain  Skin: Negative  Negative for rash  Neurological: Positive for headaches  Negative for dizziness, tremors, seizures, syncope, facial asymmetry, speech difficulty, weakness, light-headedness and numbness  Hematological: Negative  Does not bruise/bleed easily  Psychiatric/Behavioral: Negative  Negative for confusion, hallucinations and sleep disturbance  I personally reviewed the ROS as entered by the medical assistant  *Please note this document was created using voice recognition software and may contain sound-alike word errors  *

## 2019-12-23 NOTE — PATIENT INSTRUCTIONS
Increase topiramate using 25 mg tablets to 2 tablets twice daily for 1 week, then 2 tablets in the morning and 3 tablets in the evening for 1 week, then 3 tablets twice daily  Please keep a headache diary  Call in 1 month with a status report and to discuss possible further changes in medication

## 2019-12-23 NOTE — ASSESSMENT & PLAN NOTE
Unfortunately, when she self tapered off her to me for made by mid summer, she experienced a significant rebound with regard to her migraine headaches  In fact, prior to calling to reestablish the topiramate, she was experiencing multiple, intense episodes on a weekly basis  Topiramate reinstituted and presently at a schedule of 25 mg in the morning and 50 mg in the evening daily and she now has experience on average of 2 less intense headaches on a weekly basis  Hopeful for yet better control  Her control in the past was on a higher schedule of topiramate  --advance topiramate using 25 mg tablets to 2 tablets twice daily for 1 week, then 2 tablets in the morning and 3 tablets in the evening for 1 week, followed by 3 tablets twice daily  --to maintain a headache diary  --to call the office with a status update in 4 weeks or before then should she have questions or concerns

## 2019-12-23 NOTE — LETTER
December 23, 2019     Alicia Fajardo, DO  [address] [facility] [address]    Patient: Casandra Vegas   YOB: 1965   Date of Visit: 12/23/2019       Dear Dr Sharmila Stovall: Thank you for referring Paige Gray to me for evaluation  Below are my notes for this consultation  If you have questions, please do not hesitate to call me  I look forward to following your patient along with you  Sincerely,        Lorrie Cervantes MD        CC: No Recipients  Lorrie Cervantes MD  12/23/2019  4:12 PM  Sign at close encounter  Patient ID: Casandra Vegas is a 53 y o  female  Assessment/Plan:    Migraine without aura and without status migrainosus, not intractable  Unfortunately, when she self tapered off her to me for made by mid summer, she experienced a significant rebound with regard to her migraine headaches  In fact, prior to calling to reestablish the topiramate, she was experiencing multiple, intense episodes on a weekly basis  Topiramate reinstituted and presently at a schedule of 25 mg in the morning and 50 mg in the evening daily and she now has experience on average of 2 less intense headaches on a weekly basis  Hopeful for yet better control  Her control in the past was on a higher schedule of topiramate  --advance topiramate using 25 mg tablets to 2 tablets twice daily for 1 week, then 2 tablets in the morning and 3 tablets in the evening for 1 week, followed by 3 tablets twice daily  --to maintain a headache diary  --to call the office with a status update in 4 weeks or before then should she have questions or concerns  She will follow up in 8-10 weeks  Subjective:    HPI  Patient, now 53years of age, is being seen with regard to increasing headache issues  As a background, patient does have migraine without aura  At at 1 point in the past she was having multiple weekly episodes    This prompted the initiation of topiramate and at a schedule of 75 mg in the morning and 100 mg in the evening she did exceedingly well  In fact, she decided since she was doing so well to taper off her topiramate and she did so early to mid summer  That resulted in the emergence once again of multiple weekly episodes of intense migraine  She notify the office this past September  Topiramate was re-initiated and she has most recently been on a schedule of 25 mg in the morning and 50 mg in the evening daily  She has been experiencing an average of perhaps 2 headaches weekly, fortunately of less intensity  She is tolerating her current schedule topiramate with no adverse side effects  She did have recent blood work performed  Results reviewed:  CBC with hemoglobin 13 4, crit 38 9, white count 9 6 and platelet count 762; CMP with creatinine 0 84, AST 16 and ALT 29       Past Medical History:   Diagnosis Date    Anxiety and depression     [facility]congest[facility] failure) ([facility])     Heart disease     Lower back injury     Migraine without aura     Tension-type headache     V tach ([facility])     with placement of a dual pacemaker and defibrillator     Past Surgical History:   Procedure Laterality Date    CARDIAC DEFIBRILLATOR PLACEMENT      CARDIAC PACEMAKER PLACEMENT       Social History     Socioeconomic History    Marital status: /Civil Union     Spouse name: None    Number of children: None    Years of education: None    Highest education level: None   Occupational History    None   Social Needs    Financial resource strain: None    Food insecurity:     Worry: None     Inability: None    Transportation needs:     Medical: None     Non-medical: None   Tobacco Use    Smoking status: Heavy Tobacco Smoker     Packs/day: 1 00     Types: Cigarettes    Smokeless tobacco: Never Used   Substance and Sexual Activity    Alcohol use: Yes     Comment: social     Drug use: No    Sexual activity: None   Lifestyle    Physical activity:     Days per week: None     Minutes per session: None    Stress: None   Relationships    Social connections:     Talks on phone: None     Gets together: None     Attends Nondenominational service: None     Active member of club or organization: None     Attends meetings of clubs or organizations: None     Relationship status: None    Intimate partner violence:     Fear of current or ex partner: None     Emotionally abused: None     Physically abused: None     Forced sexual activity: None   Other Topics Concern    None   Social History Narrative    None     Family History   Problem Relation Age of Onset    Heart disease Family     Stroke Family      Allergies   Allergen Reactions    Cefaclor Myalgia    Ciprofloxacin Itching    Penicillin V Rash    Sulfa Antibiotics Rash       Current Outpatient Medications:     busPIRone (BUSPAR) 10 mg tablet, Take 10 mg by mouth 2 (two) times a day, Disp: , Rfl: 5    butalbital-acetaminophen-caffeine (FIORICET,ESGIC) -40 mg per tablet, Take 1 tablet by mouth every 4 (four) hours, Disp: , Rfl:     cyclobenzaprine (FLEXERIL) 10 mg tablet, TAKE 1 TABLET BY ORAL ROUTE 2 TIMES EVERY DAY AS NEEDED FOR MUSCLE SPASM, Disp: , Rfl: 0    furosemide (LASIX) 40 mg tablet, TAKE 1/2 TABLET BY ORAL ROUTE EVERY DAY, Disp: , Rfl: 0    hydrOXYzine HCL (ATARAX) 25 mg tablet, Take 25 mg by mouth every 6 (six) hours, Disp: , Rfl:     lisinopril (ZESTRIL) 10 mg tablet, Take 10 mg by mouth every evening, Disp: , Rfl: 1    metoprolol tartrate (LOPRESSOR) 25 mg tablet, Take 1 5 tablets by mouth daily  , Disp: , Rfl:     topiramate (TOPAMAX) 25 mg tablet, By mouth take 3 tablets twice daily or as directed, Disp: 180 tablet, Rfl: 3    traMADol (ULTRAM) 50 mg tablet, Take 50 mg by mouth every 6 (six) hours, Disp: , Rfl:     venlafaxine (EFFEXOR) 75 mg tablet, Take 75 mg by mouth 2 (two) times a day, Disp: , Rfl: 3    FLUoxetine (PROzac) 40 MG capsule, TAKE 1 CAPSULE BY ORAL ROUTE EVERY DAY IN THE MORNING, Disp: , Rfl: 0    simvastatin (ZOCOR) 10 mg tablet, Take 10 mg by mouth every evening, Disp: , Rfl: 5    Objective:    Blood pressure 122/76, pulse 94, resp  rate 18, height 5' 5 5" (1 664 m), weight 104 kg (228 lb 6 4 oz)  Physical Exam  Head normocephalic  Eyes nonicteric  No audible anterior neck bruits  Lungs clear to auscultation  Rhythm regular  GI (abdomen) soft nontender  Bowel sounds present  No significant lower extremity edema  Neurological Exam  Alert  Fully oriented  Appeared in good mood  Pleasantly interactive  Spontaneous gait unremarkable  Cranial nerves 2-12 tested and grossly intact except for issues with hearing right ear, old  Funduscopic examination with bilaterally marginated discs  Accurate with finger-to-nose bilaterally  Good symmetrical strength throughout the 4 extremities  No upper extremity drift  Muscle stretch reflexes bilaterally 1 throughout the upper extremities and bilaterally 1+ at the knees and ankles  ROS:    Review of Systems   Constitutional: Negative  Negative for appetite change and fever  HENT: Negative  Negative for hearing loss, tinnitus, trouble swallowing and voice change  Eyes: Negative  Negative for photophobia and pain  Respiratory: Negative  Negative for shortness of breath  Cardiovascular: Negative  Negative for palpitations  Gastrointestinal: Negative  Negative for nausea and vomiting  Endocrine: Negative  Negative for cold intolerance and heat intolerance  Genitourinary: Negative  Negative for dysuria, frequency and urgency  Musculoskeletal: Negative  Negative for myalgias and neck pain  Skin: Negative  Negative for rash  Neurological: Positive for headaches  Negative for dizziness, tremors, seizures, syncope, facial asymmetry, speech difficulty, weakness, light-headedness and numbness  Hematological: Negative  Does not bruise/bleed easily  Psychiatric/Behavioral: Negative    Negative for confusion, hallucinations and sleep disturbance  I personally reviewed the ROS as entered by the medical assistant  *Please note this document was created using voice recognition software and may contain sound-alike word errors  *

## 2020-03-20 DIAGNOSIS — G43.009 MIGRAINE WITHOUT AURA AND WITHOUT STATUS MIGRAINOSUS, NOT INTRACTABLE: ICD-10-CM

## 2020-03-25 RX ORDER — TOPIRAMATE 25 MG/1
TABLET ORAL
Qty: 180 TABLET | Refills: 0 | Status: SHIPPED | OUTPATIENT
Start: 2020-03-25 | End: 2020-03-31 | Stop reason: DRUGHIGH

## 2020-03-26 ENCOUNTER — TELEPHONE (OUTPATIENT)
Dept: NEUROLOGY | Facility: CLINIC | Age: 55
End: 2020-03-26

## 2020-03-26 NOTE — TELEPHONE ENCOUNTER
Telephone call to patient informing her that Dr Raffaele Castro sent in a refill for her Topiramate and this will be the last script that he will refill until she is seen in the office

## 2020-03-31 ENCOUNTER — TELEPHONE (OUTPATIENT)
Dept: NEUROLOGY | Facility: CLINIC | Age: 55
End: 2020-03-31

## 2020-03-31 ENCOUNTER — TELEMEDICINE (OUTPATIENT)
Dept: NEUROLOGY | Facility: CLINIC | Age: 55
End: 2020-03-31
Payer: MEDICARE

## 2020-03-31 DIAGNOSIS — G43.009 MIGRAINE WITHOUT AURA AND WITHOUT STATUS MIGRAINOSUS, NOT INTRACTABLE: Primary | ICD-10-CM

## 2020-03-31 DIAGNOSIS — G47.00 INSOMNIA, UNSPECIFIED TYPE: ICD-10-CM

## 2020-03-31 DIAGNOSIS — G47.33 OBSTRUCTIVE SLEEP APNEA (ADULT) (PEDIATRIC): ICD-10-CM

## 2020-03-31 DIAGNOSIS — G44.229 CHRONIC TENSION-TYPE HEADACHE, NOT INTRACTABLE: ICD-10-CM

## 2020-03-31 PROCEDURE — 99442 PR PHYS/QHP TELEPHONE EVALUATION 11-20 MIN: CPT | Performed by: PHYSICIAN ASSISTANT

## 2020-03-31 RX ORDER — TOPIRAMATE 100 MG/1
100 TABLET, FILM COATED ORAL 2 TIMES DAILY
Qty: 60 TABLET | Refills: 3 | Status: SHIPPED | OUTPATIENT
Start: 2020-03-31 | End: 2020-07-27

## 2020-03-31 NOTE — TELEPHONE ENCOUNTER
Lmom for pt to call back and schedule 4m f/u with Vida Weeks or Dr Ether Sicard and get appt set up for Sleep Consult

## 2020-03-31 NOTE — PROGRESS NOTES
Virtual Brief Visit    Patient continues to have headaches 3 to 4 times a week  She is currently on Topamax 75 mg b i d  For headache prevention  She has a combination of both tension type headaches and migraines  We will increase Topamax to 100 mg b i d  I also advised her to add magnesium oxide 400-500 mg daily along with B2 400 mg daily  We discussed headache triggers in detail  I asked her to stay very hydrated, get plenty of sleep and do not skip any meals  Advised to limit the amount of OTC analgesics to no more than 2 to 3 times a week to prevent rebound headache  She reports poor sleep  She has difficulty maintaining sleep  She also has history of sleep apnea and does not use a CPAP at this time  I suggested a referral to sleep medicine and she is agreeable  Will see the patient back in the office in 3-4 months or sooner if needed  She was advised to call the office for any new or worsening symptoms  Problem List Items Addressed This Visit        Cardiovascular and Mediastinum    Migraine without aura and without status migrainosus, not intractable - Primary    Relevant Medications    topiramate (TOPAMAX) 100 mg tablet       Nervous and Auditory    Tension headache, chronic    Relevant Medications    topiramate (TOPAMAX) 100 mg tablet      Other Visit Diagnoses     Insomnia, unspecified type        Relevant Orders    Ambulatory referral to Sleep Medicine    Obstructive sleep apnea (adult) (pediatric)        Relevant Orders    Ambulatory referral to Sleep Medicine                Reason for visit is follow up for headaches      Encounter provider Fredy Walton PA-C    Provider located at [facility] [address]      Recent Visits  Date Type Provider Dept   03/26/20 Telephone Pradip Crook MA [facility]   Showing recent visits within past 7 days and meeting all other requirements     Today's Visits  Date Type Provider Dept   03/31/20 Telemedicine Tracy Hernandez PA-C [facility]   Showing today's visits and meeting all other requirements     Future Appointments  Date Type Provider Dept   03/31/20 Telemedicine Tracy Hernandez PA-C [facility]   Showing future appointments within next 150 days and meeting all other requirements        After connecting through telephone, the patient was identified by name and date of birth  Lilliam Ochoa was informed that this is a telemedicine visit and that the visit is being conducted through telephone  My office door was closed  No one else was in the room  She acknowledged consent and understanding of privacy and security of the video platform  The patient has agreed to participate and understands they can discontinue the visit at any time  We discussed the limitations of the telemedicine platform and that my medical advice and examination would be limited as a consequence, the patient expressed full understanding  The patient initiated communication and agreed to participate    Patient is aware this is a billable service  Subjective  Colonel Arizmendi is a 63 y o  female who is being seen today for follow up of her headaches  She was last seen in the office in December 2019 by Dr nAa Fine  Patient has been followed for tension-type headaches and migraines  She has been on topiramate as a preventative, which has generally worked well for her  In the summer of 2019, she self discontinued the medication and had an increase in headaches  Topiramate was restarted in Sept 2019  At timing of last visit, topiramate was increased to 75mg BID, which is her current dose  Today, patient reports she is still getting headaches about half of the week  She is tolerating the topiramate well, denies any side effects  She is hoping this can be increased  She is using tylenol when she gets a headache    She notes she has poor sleep, trouble maintaining sleep  She feels this contributes to her HAs  Patient with history of "mild LUIS MIGUEL" (per pt) and was previously on a CPAP, but not using currently  Past Medical History:   Diagnosis Date    Anxiety and depression     [facility])     Heart disease     Lower back injury     Migraine without aura     Tension-type headache     V tach ([address] )     with placement of a dual pacemaker and defibrillator       Past Surgical History:   Procedure Laterality Date    CARDIAC DEFIBRILLATOR PLACEMENT      CARDIAC PACEMAKER PLACEMENT         Current Outpatient Medications   Medication Sig Dispense Refill    busPIRone (BUSPAR) 10 mg tablet Take 10 mg by mouth 2 (two) times a day  5    cyclobenzaprine (FLEXERIL) 10 mg tablet TAKE 1 TABLET BY ORAL ROUTE 2 TIMES EVERY DAY AS NEEDED FOR MUSCLE SPASM  0    furosemide (LASIX) 40 mg tablet TAKE 1/2 TABLET BY ORAL ROUTE EVERY DAY  0    hydrOXYzine HCL (ATARAX) 25 mg tablet Take 25 mg by mouth every 6 (six) hours      lisinopril (ZESTRIL) 10 mg tablet Take 10 mg by mouth every evening  1    metoprolol tartrate (LOPRESSOR) 25 mg tablet Take 1 5 tablets by mouth daily        simvastatin (ZOCOR) 10 mg tablet Take 10 mg by mouth every evening  5    traMADol (ULTRAM) 50 mg tablet Take 50 mg by mouth every 6 (six) hours      venlafaxine (EFFEXOR) 75 mg tablet Take 75 mg by mouth 2 (two) times a day  3    butalbital-acetaminophen-caffeine (FIORICET,ESGIC) -40 mg per tablet Take 1 tablet by mouth every 4 (four) hours      FLUoxetine (PROzac) 40 MG capsule TAKE 1 CAPSULE BY ORAL ROUTE EVERY DAY IN THE MORNING  0    topiramate (TOPAMAX) 100 mg tablet Take 1 tablet (100 mg total) by mouth 2 (two) times a day 60 tablet 3     No current facility-administered medications for this visit           Allergies   Allergen Reactions    Cefaclor Myalgia    Ciprofloxacin Itching    Penicillin V Rash    Sulfa Antibiotics Rash       Review of Systems Constitutional: Negative  HENT: Negative  Eyes: Negative for photophobia, pain and visual disturbance  Respiratory: Negative  Cardiovascular: Negative  Gastrointestinal: Negative for constipation, diarrhea, nausea and vomiting  Endocrine: Negative  Genitourinary: Negative  Musculoskeletal: Negative  Skin: Negative  Neurological: Positive for headaches  Hematological: Negative  Psychiatric/Behavioral: Negative for confusion, decreased concentration, dysphoric mood, hallucinations, sleep disturbance and suicidal ideas  The patient is not nervous/anxious  I spent 12 minutes with the patient during this visit

## 2020-03-31 NOTE — PATIENT INSTRUCTIONS
We will increase topiramate to 100 mg twice a day  He can also add over-the-counter magnesium oxide 400-500 mg daily and B2 400 mg daily for headache prevention  Please trying limit the amount of over-the-counter medications such as Tylenol or ibuprofen to no more than 2 to 3 times a week to prevent rebound headaches  Will refer you to Sleep Medicine for an evaluation  Will see you back in the office in 3-4 months or sooner if needed  Please do not hesitate to call the office for any questions or concerns

## 2020-04-16 DIAGNOSIS — G43.009 MIGRAINE WITHOUT AURA AND WITHOUT STATUS MIGRAINOSUS, NOT INTRACTABLE: ICD-10-CM

## 2020-04-16 RX ORDER — TOPIRAMATE 25 MG/1
TABLET ORAL
Qty: 180 TABLET | Refills: 0 | OUTPATIENT
Start: 2020-04-16

## 2020-07-26 DIAGNOSIS — G43.009 MIGRAINE WITHOUT AURA AND WITHOUT STATUS MIGRAINOSUS, NOT INTRACTABLE: ICD-10-CM

## 2020-07-27 RX ORDER — TOPIRAMATE 100 MG/1
TABLET, FILM COATED ORAL
Qty: 180 TABLET | Refills: 1 | Status: SHIPPED | OUTPATIENT
Start: 2020-07-27 | End: 2021-01-28

## 2021-01-26 DIAGNOSIS — Z01.818 PREOP EXAMINATION: Primary | ICD-10-CM

## 2021-01-28 ENCOUNTER — TELEPHONE (OUTPATIENT)
Dept: NEUROLOGY | Facility: CLINIC | Age: 56
End: 2021-01-28

## 2021-01-28 DIAGNOSIS — G43.009 MIGRAINE WITHOUT AURA AND WITHOUT STATUS MIGRAINOSUS, NOT INTRACTABLE: ICD-10-CM

## 2021-01-28 RX ORDER — TOPIRAMATE 100 MG/1
TABLET, FILM COATED ORAL
Qty: 180 TABLET | Refills: 0 | Status: SHIPPED | OUTPATIENT
Start: 2021-01-28 | End: 2021-07-22 | Stop reason: SDUPTHER

## 2021-01-28 NOTE — TELEPHONE ENCOUNTER
Called patient and scheduled her for follow up virtual telephone appointment for 3/19  Patient says she has tried the video in the past and it did not work for her  She prefers telephone if possible       Appointment scheduled for 3/19 @ 08:45 AM    [phone]

## 2021-01-28 NOTE — TELEPHONE ENCOUNTER
Patient needs appt for ongoing refills  She was last seen in March 2020  Will refill x 90 days  She is a former Dr Tram Raymundo patient

## 2021-03-12 ENCOUNTER — TELEPHONE (OUTPATIENT)
Dept: NEUROLOGY | Facility: CLINIC | Age: 56
End: 2021-03-12

## 2021-03-12 NOTE — TELEPHONE ENCOUNTER
Left message for patient confirming the appointment with Elin Allen as a virtual TELEPHONE visit office on 3/19/21 at 8:45 am     We are asking for a return call to confirm that they will be keeping the appointment, and would like to see if she can come into the office or switch to a video or doximity visit rather then a telephone visit  Please discuss and document

## 2021-03-19 ENCOUNTER — TELEPHONE (OUTPATIENT)
Dept: NEUROLOGY | Facility: CLINIC | Age: 56
End: 2021-03-19

## 2021-03-19 NOTE — TELEPHONE ENCOUNTER
Called patient to initiate virtual telephone visit as scheduled  Patient did not answer, left message on VM  Of note, my MA also called her earlier to start the visit and left her a message as well

## 2021-05-03 DIAGNOSIS — G43.009 MIGRAINE WITHOUT AURA AND WITHOUT STATUS MIGRAINOSUS, NOT INTRACTABLE: ICD-10-CM

## 2021-05-03 RX ORDER — TOPIRAMATE 100 MG/1
TABLET, FILM COATED ORAL
Qty: 180 TABLET | Refills: 0 | OUTPATIENT
Start: 2021-05-03

## 2021-05-03 NOTE — TELEPHONE ENCOUNTER
Patient not seen in over 1 year (March 2020) and was a no show for her virtual visit in March 2021  No follow up scheduled    Please let patient know she needs to schedule an appt for further refills

## 2021-07-22 ENCOUNTER — TELEMEDICINE (OUTPATIENT)
Dept: NEUROLOGY | Facility: CLINIC | Age: 56
End: 2021-07-22
Payer: MEDICARE

## 2021-07-22 ENCOUNTER — TELEPHONE (OUTPATIENT)
Dept: NEUROLOGY | Facility: CLINIC | Age: 56
End: 2021-07-22

## 2021-07-22 VITALS — WEIGHT: 210 LBS | HEIGHT: 66 IN | BODY MASS INDEX: 33.75 KG/M2

## 2021-07-22 DIAGNOSIS — G43.009 MIGRAINE WITHOUT AURA AND WITHOUT STATUS MIGRAINOSUS, NOT INTRACTABLE: ICD-10-CM

## 2021-07-22 PROCEDURE — 99214 OFFICE O/P EST MOD 30 MIN: CPT | Performed by: PHYSICIAN ASSISTANT

## 2021-07-22 RX ORDER — TOPIRAMATE 100 MG/1
100 TABLET, FILM COATED ORAL 2 TIMES DAILY
Qty: 60 TABLET | Refills: 5 | Status: SHIPPED | OUTPATIENT
Start: 2021-07-22

## 2021-07-22 RX ORDER — ACETAMINOPHEN 500 MG
1000 TABLET ORAL EVERY 6 HOURS PRN
COMMUNITY

## 2021-07-22 RX ORDER — AMOXICILLIN 250 MG
1 CAPSULE ORAL 2 TIMES DAILY
COMMUNITY
Start: 2021-02-17 | End: 2022-02-17

## 2021-07-22 RX ORDER — ZOLPIDEM TARTRATE 5 MG/1
5 TABLET ORAL
COMMUNITY
Start: 2021-06-30

## 2021-07-22 NOTE — TELEPHONE ENCOUNTER
Lmom pt to call back and schedule a return in about 4 months with Lannie Kocher (around 11/22/2021)

## 2021-07-22 NOTE — PATIENT INSTRUCTIONS
Use the [employer] website/lakhwinder to look for medication prices and see which pharmacy is cheapest  I sent the Topamax to [facility] in [place]  Re-start Topamax at 1/2 tab at bedtime x 5 days, then 1/2 tab morning and night x 5 days, then 1/2 tab morning and 1 tab night x 5 days, then 1 tablet twice a day  Follow up in 4 months  Call for any new symptoms

## 2021-07-22 NOTE — PROGRESS NOTES
Virtual Regular Visit    Verification of patient location:    Patient is currently located in the state [facility]  Patient is currently located in a state in which I am licensed    Assessment:  61year old female followed for migraine headaches, last seen over 1 year ago in March 2020  She ran out of her Topamax a few weeks ago  She has had increased headaches since stopping the Topamax  Previously while on Topamax 100 mg b i d  she was getting about 1 headache per week  We discussed restarting the Topamax and making sure she is consistent with follow-up with our office so that refills can be provided to her  We also discussed lifestyle factors that contribute to headaches  She has poor sleep and follows with PCP for this, was just prescribed Ambien  Plan:  - restart topiramate 100 mg take 1/2 tablet at bedtime x5 days, then 1/2 tablet b i d  x5 days, then 1/2 tablet a m  and 1 tablet p m  x5 days, then 1 tablet b i d    - can continue to use OTC tylenol/NSAIDs at the onset of headache  She is to limit the use of OTC analgesics to no more than 2 to 3 times a week to prevent rebound headache   - continue to maintain proper hydration, eat regularly/not skip meals, and get adequate sleep     Patient to follow-up in 4 months or sooner if needed  She was advised to call the office for any new or worsening symptoms in the meantime      Problem List Items Addressed This Visit        Cardiovascular and Mediastinum    Migraine without aura and without status migrainosus, not intractable    Relevant Medications    acetaminophen (TYLENOL) 500 mg tablet    topiramate (TOPAMAX) 100 mg tablet               Reason for visit is   Chief Complaint   Patient presents with    Virtual Regular Visit        Encounter provider Zo Mederos PA-C    Provider located at [address] [facility]  [place]  [phone]      Recent Visits  No visits were found meeting these conditions  Showing recent visits within past 7 days and meeting all other requirements  Today's Visits  Date Type Provider Dept   07/22/21 Telephone Erick Araujo MA [facility]   07/22/21 [address] [place] [address],  [address], [place] [facility]   Showing today's visits and meeting all other requirements  Future Appointments  No visits were found meeting these conditions  Showing future appointments within next 150 days and meeting all other requirements       The patient was identified by name and date of birth  Lilliam Ochoa was informed that this is a telemedicine visit and that the visit is being conducted through [address] [place] [address] Now and patient was informed that this is a secure, HIPAA-compliant platform  She agrees to proceed     My office door was closed  No one else was in the room  She acknowledged consent and understanding of privacy and security of the video platform  The patient has agreed to participate and understands they can discontinue the visit at any time  Patient is aware this is a billable service  Subjective    HPI     Lilliam FRANK Cherry Sparks is a 61 y o  female who is being seen today for follow up of her headaches  She was last seen in March 2020  She was previously follow by Dr Luly Batista for tension-type headaches and migraines  She has been on topiramate as a preventative, which has generally worked well for her  In the summer of 2019, she self discontinued the medication and had an increase in headaches  Topiramate was restarted in Sept 2019  Today, patient reports she has been off Topamax for about 2-3 weeks  She ran out of medication  She has been getting headaches 3-4 times a week while off the med  While on Topamax 100mg BID, she was getting about 1 headache per week  She denies any new neurologic symptoms at this time        Past Medical History:   Diagnosis Date    Anxiety and depression     CHF (congestive heart failure) ([facility] [address] [facility]     Heart disease     Lower back injury     Migraine without aura     Tension-type headache     V tach ([address] )     with placement of a dual pacemaker and defibrillator       Past Surgical History:   Procedure Laterality Date    CARDIAC DEFIBRILLATOR PLACEMENT      CARDIAC PACEMAKER PLACEMENT         Current Outpatient Medications   Medication Sig Dispense Refill    acetaminophen (TYLENOL) 500 mg tablet Take 1,000 mg by mouth every 6 (six) hours as needed      busPIRone (BUSPAR) 10 mg tablet Take 10 mg by mouth 2 (two) times a day  5    butalbital-acetaminophen-caffeine (FIORICET,ESGIC) -40 mg per tablet Take 1 tablet by mouth every 4 (four) hours      cyclobenzaprine (FLEXERIL) 10 mg tablet TAKE 1 TABLET BY ORAL ROUTE 2 TIMES EVERY DAY AS NEEDED FOR MUSCLE SPASM  0    furosemide (LASIX) 40 mg tablet Take 40 mg by mouth daily   0    hydrOXYzine HCL (ATARAX) 25 mg tablet Take 25 mg by mouth every 6 (six) hours as needed       lisinopril (ZESTRIL) 10 mg tablet Take 10 mg by mouth every evening  1    metoprolol tartrate (LOPRESSOR) 25 mg tablet Take 1 5 tablets by mouth daily        senna-docusate sodium (SENOKOT S) 8 6-50 mg per tablet Take 1 tablet by mouth 2 (two) times a day      simvastatin (ZOCOR) 10 mg tablet Take 10 mg by mouth every evening  5    topiramate (TOPAMAX) 100 mg tablet Take 1 tablet (100 mg total) by mouth 2 (two) times a day 60 tablet 5    traMADol (ULTRAM) 50 mg tablet Take 50 mg by mouth every 6 (six) hours      venlafaxine (EFFEXOR) 75 mg tablet Take 75 mg by mouth 2 (two) times a day  3    zolpidem (AMBIEN) 5 mg tablet Take 5 mg by mouth daily at bedtime as needed      FLUoxetine (PROzac) 40 MG capsule TAKE 1 CAPSULE BY ORAL ROUTE EVERY DAY IN THE MORNING (Patient not taking: Reported on 7/22/2021)  0     No current facility-administered medications for this visit          Allergies   Allergen Reactions    Cefaclor Myalgia    Ciprofloxacin Itching    Codeine Itching    Trimethoprim Hives    Penicillin V Rash    Sulfa Antibiotics Rash and Cough       Review of Systems   Constitutional: Positive for fatigue  Negative for appetite change and fever  HENT: Negative  Negative for hearing loss, tinnitus, trouble swallowing and voice change  Eyes: Negative  Negative for photophobia and pain  Respiratory: Positive for cough (mild cough)  Negative for shortness of breath  Cardiovascular: Negative  Negative for palpitations  Gastrointestinal: Negative  Negative for nausea and vomiting  Endocrine: Negative  Negative for cold intolerance  Genitourinary: Positive for frequency and urgency  Negative for dysuria  Musculoskeletal: Positive for back pain (lower back pain)  Negative for myalgias and neck pain  Skin: Negative  Negative for rash  Allergic/Immunologic: Negative  Neurological: Positive for headaches  Negative for dizziness, tremors, seizures, syncope, facial asymmetry, speech difficulty, weakness, light-headedness and numbness  Hematological: Negative  Does not bruise/bleed easily  Psychiatric/Behavioral: Positive for sleep disturbance  Negative for confusion and hallucinations  The patient is nervous/anxious  Depression, anxiety and mood swings       Video Exam    Vitals:    07/22/21 0956   Weight: 95 3 kg (210 lb)   Height: 5' 6" (1 676 m)       Physical Exam  Constitutional:       Appearance: Normal appearance  HENT:      Head: Normocephalic and atraumatic  Neurological:      Mental Status: She is alert        Comments: Mental status: AO x 3, able to follow commands, no dysarthria or aphasia    CN 1: not tested  CN 2: not tested  CN 3, 4, 6: no noticeable palsy, EOMs intact  CN 5: not tested  CN 7: face symmetrical  CN 8: hearing intact to voice  CN 9: not tested  CN 10: not tested  CN 11: shoulder shrug symmetric   CN 12: tongue midline     Motor:  Able to move all 4 limbs symmetrically, no pronator drift  No abnormal movements appreciated Coordination:  No dysmetria on FTN    Psychiatric:         Mood and Affect: Mood normal          Behavior: Behavior normal           I spent 13 minutes directly with the patient during this visit    VIRTUAL VISIT DISCLAIMER      Lilliam Ochoa verbally agrees to participate in [employer]  Pt is aware that [employer] could be limited without vital signs or the ability to perform a full hands-on physical exam  Lilliam Ochoa understands she or the provider may request at any time to terminate the video visit and request the patient to seek care or treatment in person